# Patient Record
Sex: FEMALE | Race: WHITE | NOT HISPANIC OR LATINO | Employment: FULL TIME | URBAN - NONMETROPOLITAN AREA
[De-identification: names, ages, dates, MRNs, and addresses within clinical notes are randomized per-mention and may not be internally consistent; named-entity substitution may affect disease eponyms.]

---
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
## 2022-01-29 ENCOUNTER — HEALTH MAINTENANCE LETTER (OUTPATIENT)
Age: 22
End: 2022-01-29

## 2022-05-28 NOTE — NURSING NOTE
"Chief Complaint   Patient presents with     Foot Injury     Was at home foot fell asleep and tried to walk. She states that foot was asleep enough to walk on the top of her foot which did cause a fall. She has a bruise on the top of her left foot.   Pain while on left foot is 5-6  Initial LMP  (LMP Unknown)  Estimated body mass index is 24.63 kg/m  as calculated from the following:    Height as of 5/3/17: 1.651 m (5' 5\").    Weight as of 5/3/17: 67.1 kg (148 lb).    Medication Reconciliation: complete      Torsten Hernandez LPN  "
28-Nov-2021

## 2022-09-11 ENCOUNTER — HEALTH MAINTENANCE LETTER (OUTPATIENT)
Age: 22
End: 2022-09-11

## 2023-05-06 ENCOUNTER — HEALTH MAINTENANCE LETTER (OUTPATIENT)
Age: 23
End: 2023-05-06

## 2024-04-09 ENCOUNTER — TRANSFERRED RECORDS (OUTPATIENT)
Dept: HEALTH INFORMATION MANAGEMENT | Facility: CLINIC | Age: 24
End: 2024-04-09

## 2024-05-14 ENCOUNTER — TRANSFERRED RECORDS (OUTPATIENT)
Dept: HEALTH INFORMATION MANAGEMENT | Facility: CLINIC | Age: 24
End: 2024-05-14

## 2024-06-10 ENCOUNTER — MEDICAL CORRESPONDENCE (OUTPATIENT)
Dept: HEALTH INFORMATION MANAGEMENT | Facility: CLINIC | Age: 24
End: 2024-06-10
Payer: COMMERCIAL

## 2024-06-18 ENCOUNTER — MEDICAL CORRESPONDENCE (OUTPATIENT)
Dept: HEALTH INFORMATION MANAGEMENT | Facility: CLINIC | Age: 24
End: 2024-06-18
Payer: COMMERCIAL

## 2024-06-26 ENCOUNTER — TRANSCRIBE ORDERS (OUTPATIENT)
Dept: OTHER | Age: 24
End: 2024-06-26

## 2024-06-26 DIAGNOSIS — M67.962: Primary | ICD-10-CM

## 2024-07-02 NOTE — TELEPHONE ENCOUNTER
Action 07/02/24 4:19 PM AC   Action Taken  Request sent to [place] F:[phone] for MRI and XR to be pushed to PACS     DIAGNOSIS: (L) knee synovium disorder, surgical consult / Dr. Raheem Looney at [facility] / [place] / MRI & x-rays @ [facility] ([place]), no previous surgeries    APPOINTMENT DATE: 7/22/24   NOTES STATUS DETAILS   OFFICE NOTE from referring provider Care Everywhere 6/18/24 - Raheem Looney NP - [place]     MEDICATION LIST Internal    MRI PACS 6/10/24 - MR Knee Left    XRAYS (IMAGES & REPORTS) PACS 4/8/24 - XR Knee Morales  [birth year]

## 2024-07-13 ENCOUNTER — HEALTH MAINTENANCE LETTER (OUTPATIENT)
Age: 24
End: 2024-07-13

## 2024-07-22 ENCOUNTER — PRE VISIT (OUTPATIENT)
Dept: ORTHOPEDICS | Facility: CLINIC | Age: 24
End: 2024-07-22

## 2024-10-21 ENCOUNTER — APPOINTMENT (OUTPATIENT)
Dept: GENERAL RADIOLOGY | Facility: OTHER | Age: 24
End: 2024-10-21
Payer: MEDICAID

## 2024-10-21 ENCOUNTER — HOSPITAL ENCOUNTER (EMERGENCY)
Facility: OTHER | Age: 24
Discharge: HOME | End: 2024-10-21
Payer: MEDICAID

## 2024-10-21 VITALS
TEMPERATURE: 98.9 F | BODY MASS INDEX: 23.05 KG/M2 | WEIGHT: 145 LBS | HEART RATE: 86 BPM | RESPIRATION RATE: 18 BRPM | DIASTOLIC BLOOD PRESSURE: 77 MMHG | SYSTOLIC BLOOD PRESSURE: 118 MMHG | OXYGEN SATURATION: 94 %

## 2024-10-21 DIAGNOSIS — J18.9 PNEUMONIA OF LEFT UPPER LOBE DUE TO INFECTIOUS ORGANISM: ICD-10-CM

## 2024-10-21 LAB
ANION GAP SERPL CALCULATED.3IONS-SCNC: 8 MMOL/L (ref 7–15)
BASOPHILS # BLD AUTO: 0 10E3/UL (ref 0–0.2)
BASOPHILS NFR BLD AUTO: 0 %
BUN SERPL-MCNC: 7.8 MG/DL (ref 6–20)
CALCIUM SERPL-MCNC: 8.8 MG/DL (ref 8.8–10.4)
CHLORIDE SERPL-SCNC: 105 MMOL/L (ref 98–107)
CREAT SERPL-MCNC: 0.79 MG/DL (ref 0.51–0.95)
EGFRCR SERPLBLD CKD-EPI 2021: >90 ML/MIN/1.73M2
EOSINOPHIL # BLD AUTO: 0 10E3/UL (ref 0–0.7)
EOSINOPHIL NFR BLD AUTO: 0 %
ERYTHROCYTE [DISTWIDTH] IN BLOOD BY AUTOMATED COUNT: 12.2 % (ref 10–15)
FLUAV RNA SPEC QL NAA+PROBE: NEGATIVE
FLUBV RNA RESP QL NAA+PROBE: NEGATIVE
GLUCOSE SERPL-MCNC: 101 MG/DL (ref 70–99)
GROUP A STREP BY PCR: NOT DETECTED
HCO3 SERPL-SCNC: 25 MMOL/L (ref 22–29)
HCT VFR BLD AUTO: 38.7 % (ref 35–47)
HGB BLD-MCNC: 13.3 G/DL (ref 11.7–15.7)
IMM GRANULOCYTES # BLD: 0 10E3/UL
IMM GRANULOCYTES NFR BLD: 0 %
LYMPHOCYTES # BLD AUTO: 1.1 10E3/UL (ref 0.8–5.3)
LYMPHOCYTES NFR BLD AUTO: 34 %
MCH RBC QN AUTO: 31.3 PG (ref 26.5–33)
MCHC RBC AUTO-ENTMCNC: 34.4 G/DL (ref 31.5–36.5)
MCV RBC AUTO: 91 FL (ref 78–100)
MONOCYTES # BLD AUTO: 0.4 10E3/UL (ref 0–1.3)
MONOCYTES NFR BLD AUTO: 13 %
NEUTROPHILS # BLD AUTO: 1.6 10E3/UL (ref 1.6–8.3)
NEUTROPHILS NFR BLD AUTO: 52 %
NRBC # BLD AUTO: 0 10E3/UL
NRBC BLD AUTO-RTO: 0 /100
PLATELET # BLD AUTO: 142 10E3/UL (ref 150–450)
POTASSIUM SERPL-SCNC: 3.9 MMOL/L (ref 3.4–5.3)
RBC # BLD AUTO: 4.25 10E6/UL (ref 3.8–5.2)
RSV RNA SPEC NAA+PROBE: NEGATIVE
SARS-COV-2 RNA RESP QL NAA+PROBE: NEGATIVE
SODIUM SERPL-SCNC: 138 MMOL/L (ref 135–145)
WBC # BLD AUTO: 3.1 10E3/UL (ref 4–11)

## 2024-10-21 PROCEDURE — 87651 STREP A DNA AMP PROBE: CPT

## 2024-10-21 PROCEDURE — 80048 BASIC METABOLIC PNL TOTAL CA: CPT

## 2024-10-21 PROCEDURE — 71046 X-RAY EXAM CHEST 2 VIEWS: CPT

## 2024-10-21 PROCEDURE — 250N000013 HC RX MED GY IP 250 OP 250 PS 637

## 2024-10-21 PROCEDURE — 85004 AUTOMATED DIFF WBC COUNT: CPT

## 2024-10-21 PROCEDURE — 99284 EMERGENCY DEPT VISIT MOD MDM: CPT

## 2024-10-21 PROCEDURE — 87637 SARSCOV2&INF A&B&RSV AMP PRB: CPT

## 2024-10-21 PROCEDURE — 36415 COLL VENOUS BLD VENIPUNCTURE: CPT

## 2024-10-21 PROCEDURE — 99284 EMERGENCY DEPT VISIT MOD MDM: CPT | Mod: 25

## 2024-10-21 RX ORDER — ALBUTEROL SULFATE 90 UG/1
2 INHALANT RESPIRATORY (INHALATION) EVERY 4 HOURS PRN
Qty: 18 G | Refills: 0 | Status: SHIPPED | OUTPATIENT
Start: 2024-10-21

## 2024-10-21 RX ORDER — DOXYCYCLINE 100 MG/1
100 CAPSULE ORAL 2 TIMES DAILY
Qty: 10 CAPSULE | Refills: 0 | Status: SHIPPED | OUTPATIENT
Start: 2024-10-21 | End: 2024-10-26

## 2024-10-21 RX ORDER — ALBUTEROL SULFATE 90 UG/1
2 INHALANT RESPIRATORY (INHALATION) ONCE
Status: COMPLETED | OUTPATIENT
Start: 2024-10-21 | End: 2024-10-21

## 2024-10-21 RX ORDER — DOXYCYCLINE 100 MG/1
100 CAPSULE ORAL ONCE
Status: COMPLETED | OUTPATIENT
Start: 2024-10-21 | End: 2024-10-21

## 2024-10-21 RX ORDER — PREDNISONE 20 MG/1
20 TABLET ORAL DAILY
Qty: 5 TABLET | Refills: 0 | Status: SHIPPED | OUTPATIENT
Start: 2024-10-21 | End: 2024-10-26

## 2024-10-21 RX ADMIN — DOXYCYCLINE HYCLATE 100 MG: 100 CAPSULE ORAL at 11:15

## 2024-10-21 RX ADMIN — ALBUTEROL SULFATE 2 PUFF: 90 AEROSOL, METERED RESPIRATORY (INHALATION) at 10:11

## 2024-10-21 ASSESSMENT — COLUMBIA-SUICIDE SEVERITY RATING SCALE - C-SSRS
6. HAVE YOU EVER DONE ANYTHING, STARTED TO DO ANYTHING, OR PREPARED TO DO ANYTHING TO END YOUR LIFE?: NO
2. HAVE YOU ACTUALLY HAD ANY THOUGHTS OF KILLING YOURSELF IN THE PAST MONTH?: NO
1. IN THE PAST MONTH, HAVE YOU WISHED YOU WERE DEAD OR WISHED YOU COULD GO TO SLEEP AND NOT WAKE UP?: NO

## 2024-10-21 ASSESSMENT — ACTIVITIES OF DAILY LIVING (ADL)
ADLS_ACUITY_SCORE: 35

## 2024-10-21 NOTE — ED TRIAGE NOTES
Patient started feeling ill Saturday. She is SOB with a cough. She states that she is having body aches. She does state that chest pain is present while inhaling. No sore throat.      Triage Assessment (Adult)       Row Name 10/21/24 0933          Triage Assessment    Airway WDL WDL        Respiratory WDL    Respiratory WDL X;rhythm/pattern     Rhythm/Pattern, Respiratory shortness of breath        Skin Circulation/Temperature WDL    Skin Circulation/Temperature WDL WDL        Cardiac WDL    Cardiac WDL X;chest pain        Peripheral/Neurovascular WDL    Peripheral Neurovascular WDL WDL        Cognitive/Neuro/Behavioral WDL    Cognitive/Neuro/Behavioral WDL WDL

## 2024-10-21 NOTE — DISCHARGE INSTRUCTIONS
Your chest x-ray reveals left upper lobe pneumonia. I prescribed Doxycyline 100mg twice a day for 5 days. Prescription sent to [facility]'s pharmacy. Use albuterol inhaler every 4 hours as needed for shortness of breath. Rest and drink plenty of fluids. No work for the next couple of days. Tylenol and ibuprofen for fever and chills. Will prescribe a short course of steroids for your pneumonia.     Please return to the emergency room if you experience worsening shortness of breath, persistent fever, vomiting, not able to keep fluids down, or worsening symptoms.

## 2024-10-21 NOTE — Clinical Note
Nancy Brian was seen and treated in our emergency department on 10/21/2024.  She may return to work on 10/24/2024.       If you have any questions or concerns, please don't hesitate to call.      Macey Perez APRN CNP

## 2024-10-21 NOTE — ED PROVIDER NOTES
History     Chief Complaint   Patient presents with    Shortness of Breath     HPI  Nancy Brian is a 24 year old female with cough, shortness of breath, fever, and chest tightness. Symptoms started 3 days ago. Increased pain with taking a deep breath. Chest is painful with inhalation. Fever last night 101.8. She last took tylenol this am. Denies sore throat. Denies abdominal pain.     Allergies:  Allergies   Allergen Reactions    Amoxicillin Hives, Itching and Rash    Nsaids Other (See Comments)     Contraindicated due to history of gastric bypass       Problem List:    Patient Active Problem List    Diagnosis Date Noted    BV (bacterial vaginosis) 04/06/2017     Priority: Medium     Ordered Flagyl to take at 10 weeks gestation.      Supervision of normal first pregnancy in first trimester 04/03/2017     Priority: Medium     Teenage pregnancy/ FOB involved/Mom Sonya  B positive          Past Medical History:    History reviewed. No pertinent past medical history.    Past Surgical History:    History reviewed. No pertinent surgical history.    Family History:    History reviewed. No pertinent family history.    Social History:  Marital Status:  Single [1]  Social History     Tobacco Use    Smoking status: Never    Smokeless tobacco: Never   Substance Use Topics    Alcohol use: Not Currently    Drug use: Never        Medications:    albuterol (PROAIR HFA/PROVENTIL HFA/VENTOLIN HFA) 108 (90 Base) MCG/ACT inhaler  doxycycline hyclate (VIBRAMYCIN) 100 MG capsule  predniSONE (DELTASONE) 20 MG tablet  etonogestrel (IMPLANON/NEXPLANON) 68 MG IMPL  norgestimate-ethinyl estradiol (ORTHO-CYCLEN) 0.25-35 MG-MCG tablet          Review of Systems   Constitutional:  Positive for chills, diaphoresis, fatigue and fever.   Respiratory:  Positive for cough, chest tightness and shortness of breath.    All other systems reviewed and are negative.      Physical Exam   BP: 118/77  Pulse: 86  Temp: 98.9  F (37.2  C)  Resp:  18  Weight: 65.8 kg (145 lb)  SpO2: 94 %      Physical Exam  Vitals and nursing note reviewed.   Constitutional:       General: She is not in acute distress.     Appearance: She is ill-appearing.   HENT:      Head: Normocephalic.      Right Ear: Tympanic membrane normal.      Left Ear: Tympanic membrane normal.      Nose: Nose normal.      Mouth/Throat:      Lips: Pink.      Mouth: Mucous membranes are moist.      Pharynx: Posterior oropharyngeal erythema present. No uvula swelling.   Eyes:      Conjunctiva/sclera: Conjunctivae normal.   Cardiovascular:      Rate and Rhythm: Normal rate and regular rhythm.      Pulses: Normal pulses.      Heart sounds: Normal heart sounds.   Pulmonary:      Effort: Pulmonary effort is normal.      Breath sounds: Examination of the right-upper field reveals rales. Examination of the right-lower field reveals rales. Rales present.   Chest:      Chest wall: No tenderness or crepitus.   Abdominal:      General: Bowel sounds are normal.      Palpations: Abdomen is soft.      Tenderness: There is no abdominal tenderness.   Musculoskeletal:         General: Normal range of motion.      Cervical back: Normal range of motion and neck supple.   Skin:     General: Skin is warm and dry.      Capillary Refill: Capillary refill takes less than 2 seconds.   Neurological:      General: No focal deficit present.      Mental Status: She is alert.   Psychiatric:         Mood and Affect: Mood normal.              No results found for this or any previous visit (from the past 24 hours).      Medications   albuterol (PROVENTIL HFA/VENTOLIN HFA) inhaler (2 puffs Inhalation $Given 10/21/24 1011)   doxycycline hyclate (VIBRAMYCIN) capsule 100 mg (100 mg Oral $Given 10/21/24 1115)       Assessments & Plan (with Medical Decision Making)  Nancy Brian is a 24 year old female with cough, shortness of breath, fever, and chest tightness. Symptoms started 3 days ago. Increased pain with taking a deep breath.  Chest is painful with inhalation. Fever last night 101.8. She last took tylenol this am. Denies sore throat. Denies abdominal pain.   VS in the ED. /77   Pulse 86   Temp 98.9  F (37.2  C) (Tympanic)   Resp 18   Wt 65.8 kg (145 lb)   SpO2 94%   BMI 23.05 kg/m    Diagnostics:    Lab: Covid/Flu/RSV- negative. Strep- negative.  CBC-okay. CMP- okay.     X-ray: CXR- Left upper lobe pneumonia.     Nancy is a 25 y/o female evaluated today for shortness of breath, cough, and fever concerns. She is ill appearing. No acute distress. Oropharyngeal erythema. LS rales right upper and lower lobe. No s/s of increased work of breathing. CXR reveals left upper lobe pneumonia. Chest tightness reduced after using inhaler. Allergy amoxicillin. Will treat with Doxycycline. Prescription sent to [facility]. A short course of prednisone prescribed to help with inflammation and pain. She would like to go home. Work note given. No hypoxia while in the ED. Tolerating oral fluids. Discussed return to ED precautions. Verbalizes understanding of discharge plan.        I have reviewed the nursing notes.    I have reviewed the findings, diagnosis, plan and need for follow up with the patient.  Medical Decision Making  The patient's presentation was of moderate complexity (an acute illness with systemic symptoms).    The patient's evaluation involved:  an assessment requiring an independent historian (see separate area of note for details)  ordering and/or review of 3+ test(s) in this encounter (see separate area of note for details)    The patient's management necessitated moderate risk (prescription drug management including medications given in the ED).    Discharge Medication List as of 10/21/2024  1:23 PM        START taking these medications    Details   albuterol (PROAIR HFA/PROVENTIL HFA/VENTOLIN HFA) 108 (90 Base) MCG/ACT inhaler Inhale 2 puffs into the lungs every 4 hours as needed for shortness of breath, wheezing or cough.,  Disp-18 g, R-0, E-PrescribePharmacy may dispense brand covered by insurance (Proair, or proventil or ventolin or generic albuterol inhaler)      doxycycline hyclate (VIBRAMYCIN) 100 MG capsule Take 1 capsule (100 mg) by mouth 2 times daily for 5 days., Disp-10 capsule, R-0, E-Prescribe           Final diagnoses:   Pneumonia of left upper lobe due to infectious organism     10/21/2024   [facility], APRN CNP  10/25/24 1954

## 2024-10-25 ASSESSMENT — ENCOUNTER SYMPTOMS
FATIGUE: 1
COUGH: 1
SHORTNESS OF BREATH: 1
CHILLS: 1
DIAPHORESIS: 1
CHEST TIGHTNESS: 1
FEVER: 1

## 2024-11-21 ENCOUNTER — OFFICE VISIT (OUTPATIENT)
Dept: INTERNAL MEDICINE | Facility: OTHER | Age: 24
End: 2024-11-21
Attending: FAMILY MEDICINE
Payer: COMMERCIAL

## 2024-11-21 VITALS
OXYGEN SATURATION: 100 % | RESPIRATION RATE: 16 BRPM | DIASTOLIC BLOOD PRESSURE: 62 MMHG | SYSTOLIC BLOOD PRESSURE: 122 MMHG | TEMPERATURE: 97.2 F | HEART RATE: 96 BPM | WEIGHT: 153 LBS | BODY MASS INDEX: 24.32 KG/M2

## 2024-11-21 DIAGNOSIS — R06.02 SHORTNESS OF BREATH: ICD-10-CM

## 2024-11-21 DIAGNOSIS — R07.89 CHEST TIGHTNESS: ICD-10-CM

## 2024-11-21 DIAGNOSIS — J18.9 PNEUMONIA OF LEFT UPPER LOBE DUE TO INFECTIOUS ORGANISM: Primary | ICD-10-CM

## 2024-11-21 PROCEDURE — G0463 HOSPITAL OUTPT CLINIC VISIT: HCPCS | Mod: 25

## 2024-11-21 RX ORDER — CEFPODOXIME PROXETIL 200 MG/1
200 TABLET, FILM COATED ORAL 2 TIMES DAILY
Qty: 10 TABLET | Refills: 0 | Status: SHIPPED | OUTPATIENT
Start: 2024-11-21 | End: 2024-11-26

## 2024-11-21 RX ORDER — AZITHROMYCIN 250 MG/1
TABLET, FILM COATED ORAL
Qty: 6 TABLET | Refills: 0 | Status: SHIPPED | OUTPATIENT
Start: 2024-11-21 | End: 2024-11-26

## 2024-11-21 ASSESSMENT — ENCOUNTER SYMPTOMS
APPETITE CHANGE: 1
FEVER: 1
SHORTNESS OF BREATH: 1
CHEST TIGHTNESS: 1
COUGH: 1
NAUSEA: 0
ABDOMINAL PAIN: 0
WHEEZING: 0

## 2024-11-21 ASSESSMENT — PAIN SCALES - GENERAL: PAINLEVEL_OUTOF10: SEVERE PAIN (6)

## 2024-11-21 NOTE — PATIENT INSTRUCTIONS
Results for orders placed or performed during the hospital encounter of 11/21/24   XR Chest 2 Views     Status: None    Narrative    PROCEDURE: XR CHEST 2 VIEWS 11/21/2024 2:47 PM    HISTORY: Pneumonia of left upper lobe due to infectious organism    COMPARISONS: 10/21/2024.    TECHNIQUE: 2 views.    FINDINGS: Heart and pulmonary vasculature are normal. There has been  near complete clearing of previously seen left upper lobe infiltrate.  Some mild linear density persists adjacent to left heart border.    Right lung is clear. Pleural spaces are clear.         Impression    IMPRESSION: Partial clearing of previously seen left infiltrate.    LEONARDO RAJAN MD         SYSTEM ID:  [id number]

## 2024-11-21 NOTE — PROGRESS NOTES
Assessment & Plan     ICD-10-CM    1. Pneumonia of left upper lobe due to infectious organism  J18.9 XR Chest 2 Views     cefpodoxime (VANTIN) 200 MG tablet     azithromycin (ZITHROMAX) 250 MG tablet      2. Chest tightness  R07.89       3. Shortness of breath  R06.02          Chest x-ray shows partial clearing of left upper lobe infiltrate. Discussed importance of finishing course of antibiotics to prevent permanent lung scarring. We will trial Vantin twice daily for 5 days with 5 day course of azithromycin. Instructed her to take medicine with food, and to try to finish antibiotic course. Follow up if symptoms worsen or do not improve.       The longitudinal plan of care for the diagnosis(es)/condition(s) as documented were addressed during this visit. Due to the added complexity in care, I will continue to support Nancy in the subsequent management and with ongoing continuity of care.              No follow-ups on file.      JOSEPH Vergara [facility]    Review of Systems   Constitutional:  Positive for appetite change and fever (5-6 days ago 101.3).   Respiratory:  Positive for cough, chest tightness and shortness of breath. Negative for wheezing.    Gastrointestinal:  Negative for abdominal pain and nausea.         Kirk Cruz is a 24 year old, presenting for the following health issues:  RECHECK (Ongoing cold issues)    Patient presents to clinic with concerns of ongoing symptoms of pneumonia.  She was evaluated in the emergency room on 10/21/2024 and diagnosed with left upper pneumonia.  At that time she was prescribed a 5-day course of doxycycline.  Patient stated that she took 1 full day of the antibiotic but then developed nausea and abdominal pain. She has a history of gastric bypass and has difficulty taking antibiotics. Her symptoms are mostly unchanged, she had a fever 6 days ago. Continues to have productive cough, shortness of breath, and chest tightness.          History of Present Illness       Reason for visit:  Possible pneumonia    She eats 2-3 servings of fruits and vegetables daily.She consumes 2 sweetened beverage(s) daily.She exercises with enough effort to increase her heart rate 30 to 60 minutes per day.  She exercises with enough effort to increase her heart rate 3 or less days per week.   She is taking medications regularly.                     Objective    /62 (BP Location: Right arm, Patient Position: Sitting, Cuff Size: Adult Regular)   Pulse 96   Temp 97.2  F (36.2  C) (Temporal)   Resp 16   Wt 69.4 kg (153 lb)   SpO2 100%   BMI 24.32 kg/m    Body mass index is 24.32 kg/m .  Physical Exam  Vitals reviewed.   Constitutional:       General: She is not in acute distress.     Appearance: She is well-developed.   HENT:      Head: Normocephalic and atraumatic.      Right Ear: Tympanic membrane, ear canal and external ear normal.      Left Ear: Tympanic membrane, ear canal and external ear normal.      Nose: Nose normal.      Mouth/Throat:      Pharynx: No oropharyngeal exudate.   Eyes:      General: No scleral icterus.     Conjunctiva/sclera: Conjunctivae normal.      Pupils: Pupils are equal, round, and reactive to light.   Neck:      Thyroid: No thyromegaly.   Cardiovascular:      Rate and Rhythm: Normal rate and regular rhythm.      Heart sounds: No murmur heard.  Pulmonary:      Effort: Pulmonary effort is normal. No respiratory distress.      Breath sounds: Normal breath sounds. No wheezing.   Musculoskeletal:         General: No tenderness or deformity. Normal range of motion.      Cervical back: Normal range of motion and neck supple.   Skin:     General: Skin is warm and dry.      Findings: No rash.   Neurological:      Mental Status: She is alert and oriented to person, place, and time.      Cranial Nerves: No cranial nerve deficit.      Coordination: Coordination normal.   Psychiatric:         Behavior: Behavior normal.         Thought  Content: Thought content normal.         Judgment: Judgment normal.        Results for orders placed or performed during the hospital encounter of 11/21/24   XR Chest 2 Views     Status: None    Narrative    PROCEDURE: XR CHEST 2 VIEWS 11/21/2024 2:47 PM    HISTORY: Pneumonia of left upper lobe due to infectious organism    COMPARISONS: 10/21/2024.    TECHNIQUE: 2 views.    FINDINGS: Heart and pulmonary vasculature are normal. There has been  near complete clearing of previously seen left upper lobe infiltrate.  Some mild linear density persists adjacent to left heart border.    Right lung is clear. Pleural spaces are clear.         Impression    IMPRESSION: Partial clearing of previously seen left infiltrate.    LEONARDO RAJAN MD         SYSTEM ID:  [id number]                   Signed Electronically by: JOSEPH Vergara CNP

## 2025-07-19 ENCOUNTER — HEALTH MAINTENANCE LETTER (OUTPATIENT)
Age: 25
End: 2025-07-19

## 2025-07-30 ENCOUNTER — OFFICE VISIT (OUTPATIENT)
Dept: FAMILY MEDICINE | Facility: OTHER | Age: 25
End: 2025-07-30

## 2025-07-30 VITALS
HEIGHT: 65 IN | BODY MASS INDEX: 27.66 KG/M2 | DIASTOLIC BLOOD PRESSURE: 62 MMHG | HEART RATE: 107 BPM | RESPIRATION RATE: 18 BRPM | TEMPERATURE: 98.3 F | OXYGEN SATURATION: 100 % | WEIGHT: 166 LBS | SYSTOLIC BLOOD PRESSURE: 106 MMHG

## 2025-07-30 DIAGNOSIS — N76.0 BACTERIAL VAGINOSIS: Primary | ICD-10-CM

## 2025-07-30 DIAGNOSIS — B37.31 YEAST INFECTION OF THE VAGINA: ICD-10-CM

## 2025-07-30 DIAGNOSIS — N89.8 VAGINAL DISCHARGE: ICD-10-CM

## 2025-07-30 DIAGNOSIS — B96.89 BACTERIAL VAGINOSIS: Primary | ICD-10-CM

## 2025-07-30 LAB
BACTERIAL VAGINOSIS VAG-IMP: POSITIVE
C TRACH DNA SPEC QL PROBE+SIG AMP: NEGATIVE
CANDIDA DNA VAG QL NAA+PROBE: DETECTED
CANDIDA GLABRATA / CANDIDA KRUSEI DNA: NOT DETECTED
N GONORRHOEA DNA SPEC QL NAA+PROBE: NEGATIVE
SPECIMEN TYPE: NORMAL
T VAGINALIS DNA VAG QL NAA+PROBE: NOT DETECTED

## 2025-07-30 PROCEDURE — G0463 HOSPITAL OUTPT CLINIC VISIT: HCPCS

## 2025-07-30 PROCEDURE — 87491 CHLMYD TRACH DNA AMP PROBE: CPT | Mod: ZL

## 2025-07-30 PROCEDURE — 81515 NFCT DS BV&VAGINITIS DNA ALG: CPT | Mod: ZL

## 2025-07-30 RX ORDER — FLUCONAZOLE 150 MG/1
150 TABLET ORAL
Qty: 3 TABLET | Refills: 0 | Status: SHIPPED | OUTPATIENT
Start: 2025-07-30 | End: 2025-08-06

## 2025-07-30 RX ORDER — METRONIDAZOLE 500 MG/1
500 TABLET ORAL 2 TIMES DAILY
Qty: 14 TABLET | Refills: 0 | Status: SHIPPED | OUTPATIENT
Start: 2025-07-30 | End: 2025-08-06

## 2025-07-30 ASSESSMENT — PAIN SCALES - GENERAL: PAINLEVEL_OUTOF10: NO PAIN (0)

## 2025-07-30 NOTE — NURSING NOTE
"Chief Complaint   Patient presents with    STD     Testing       Patient here with friend for possible STD. Patient states she has been having green tinged discharge since 7/22/2025 after a new sexual partner.    Initial /62 (BP Location: Right arm, Patient Position: Sitting, Cuff Size: Adult Regular)   Pulse 107   Temp 98.3  F (36.8  C) (Temporal)   Resp 18   Ht 1.651 m (5' 5\")   Wt 75.3 kg (166 lb)   SpO2 100%   BMI 27.62 kg/m   Estimated body mass index is 27.62 kg/m  as calculated from the following:    Height as of this encounter: 1.651 m (5' 5\").    Weight as of this encounter: 75.3 kg (166 lb).  Medication Review: complete    The next two questions are to help us understand your food security.  If you are feeling you need any assistance in this area, we have resources available to support you today.          7/30/2025   SDOH- Food Insecurity   Within the past 12 months, did you worry that your food would run out before you got money to buy more? N   Within the past 12 months, did the food you bought just not last and you didn t have money to get more? N         Health Care Directive:  Patient does not have a Health Care Directive: Discussed advance care planning with patient; however, patient declined at this time.    Brittany Keys LPN      "

## 2025-07-30 NOTE — PROGRESS NOTES
ASSESSMENT/PLAN:    I have reviewed the nursing notes.  I have reviewed the findings, diagnosis, plan and need for follow up with the patient.    1. Bacterial vaginosis (Primary)  2. Vaginal discharge  - GC/Chlamydia by PCR  - Multiplex Vaginal Panel by PCR  - metroNIDAZOLE (FLAGYL) 500 MG tablet; Take 1 tablet (500 mg) by mouth 2 times daily for 7 days.  Dispense: 14 tablet; Refill: 0    Patient presents with vaginal discharge.  Vitals are stable.  GC/chlamydia was negative.  Multiplex vaginal panel came back positive for bacterial vaginosis.  Will treat with metronidazole.  Advised patient not to drink alcohol while on the metronidazole as it can make her ill.  Advised patient to refrain from any sexual activity until she has completed treatment.    3. Yeast infection of the vagina  - fluconazole (DIFLUCAN) 150 MG tablet; Take 1 tablet (150 mg) by mouth every 3 days for 3 doses.  Dispense: 3 tablet; Refill: 0    Patient also tested positive for vaginal yeast.  Will treat with a 3 dose regimen of fluconazole as she is going to be on an antibiotic for bacterial vaginosis.    Discussed warning signs/symptoms indicative of need to f/u    Follow up if symptoms persist or worsen or concerns    I explained my diagnostic considerations and recommendations to the patient, who voiced understanding and agreement with the treatment plan. All questions were answered. We discussed potential side effects of any prescribed or recommended therapies, as well as expectations for response to treatments.    JOSEPH Sigala CNP  7/30/2025  1:22 PM    HPI:    Nancy Brian is a 25 year old female  who presents to [facility] today for concerns of vaginal symptoms    Vaginal Symptoms  Onset/Duration: 1 week  Description:  Vaginal Discharge: green, creamy   Itching (Pruritis): No  Burning sensation:  No  Odor: No  Accompanying Signs & Symptoms:  Urinary symptoms: No  Abdominal pain: No  Fever: No  History:   Sexually active:  "YES  New Partner: YES  Possibility of Pregnancy:  No  Recent antibiotic use: No  Previous vaginitis issues: YES- BV  Therapies tried and outcome: none      History reviewed. No pertinent past medical history.  History reviewed. No pertinent surgical history.  Social History     Tobacco Use    Smoking status: Never    Smokeless tobacco: Never   Substance Use Topics    Alcohol use: Not Currently     Current Outpatient Medications   Medication Sig Dispense Refill    etonogestrel (IMPLANON/NEXPLANON) 68 MG IMPL Inject 1 each Subcutaneous      albuterol (PROAIR HFA/PROVENTIL HFA/VENTOLIN HFA) 108 (90 Base) MCG/ACT inhaler Inhale 2 puffs into the lungs every 4 hours as needed for shortness of breath, wheezing or cough. (Patient not taking: Reported on 7/30/2025) 18 g 0     Allergies   Allergen Reactions    Amoxicillin Hives, Itching and Rash    Nsaids Other (See Comments)     Contraindicated due to history of gastric bypass     Past medical history, past surgical history, current medications and allergies reviewed and accurate to the best of my knowledge.      ROS:  Refer to HPI    /62 (BP Location: Right arm, Patient Position: Sitting, Cuff Size: Adult Regular)   Pulse 107   Temp 98.3  F (36.8  C) (Temporal)   Resp 18   Ht 1.651 m (5' 5\")   Wt 75.3 kg (166 lb)   SpO2 100%   BMI 27.62 kg/m      EXAM:  General Appearance: Well appearing 25 year old female, appropriate appearance for age. No acute distress   Neuro: Alert and oriented to person, place, and time.  Cranial nerves II-XII grossly intact with no focal or lateralizing deficits.  Muscle tone normal.  Gait normal. No tremor.   Psychological: normal affect, alert, oriented, and pleasant.     Labs:  Results for orders placed or performed in visit on 07/30/25   GC/Chlamydia by PCR     Status: None    Specimen: Urine, Voided   Result Value Ref Range    Chlamydia Trachomatis Negative Negative    Neisseria gonorrhoeae Negative Negative    CTNG Specimen Source " Urine, Voided     Narrative    Assay performed using Takes real-time, reverse-transcriptase PCR.   Multiplex Vaginal Panel by PCR     Status: Abnormal    Specimen: Vagina; Swab   Result Value Ref Range    Bacterial Vaginosis Organism DNA Positive (A) Negative    Candida Group DNA Detected (A) Not Detected    Candida glabrata / Leidy krusei DNA Not Detected Not Detected    Trichomonas vaginalis DNA Not Detected Not Detected    Narrative    The Xpert  Xpress MVP test, performed on the [employer], is an automated, qualitative in vitro diagnostic test for the detection of DNA targets from anaerobic bacteria associated with bacterial vaginosis, Candida species associated with vulvovaginal candidiasis, and Trichomonas vaginalis. The assay uses clinician-collected and self-collected vaginal swabs from patients who are symptomatic for vaginitis/ vaginosis. The Xpert  Xpress MVP test utilizes real-time polymerase chain reaction (PCR) for the amplification of specific DNA targets and utilizes fluorogenic target-specific hybridization probes to detect and differentiate DNA. It is intended to aid in the diagnosis of vaginal infections in women with a clinical presentation consistent with bacterial vaginosis, vulvovaginal candidiasis, or trichomoniasis.   The assay targets three anaerobic microorgansims that are associated with bacterial vaginosis (BV). Other organisms that are not detected by the Xpert  Xpress MVP test have also been reported to be associated with BV. The BV organism and Candida species targets of the Xpert  Xpress MVP test can be commensal in women; positive results must be considered in conjunction with other clinical and patient information to determine the disease status.

## (undated) RX ORDER — ALBUTEROL SULFATE 90 UG/1
INHALANT RESPIRATORY (INHALATION)
Status: DISPENSED
Start: 2024-10-21

## (undated) RX ORDER — LIDOCAINE HYDROCHLORIDE 10 MG/ML
INJECTION, SOLUTION INFILTRATION; PERINEURAL
Status: DISPENSED
Start: 2020-12-22

## (undated) RX ORDER — LIDOCAINE HYDROCHLORIDE 10 MG/ML
INJECTION, SOLUTION EPIDURAL; INFILTRATION; INTRACAUDAL; PERINEURAL
Status: DISPENSED
Start: 2020-12-22

## (undated) RX ORDER — DOXYCYCLINE 100 MG/1
CAPSULE ORAL
Status: DISPENSED
Start: 2024-10-21